# Patient Record
Sex: FEMALE | Race: WHITE | URBAN - METROPOLITAN AREA
[De-identification: names, ages, dates, MRNs, and addresses within clinical notes are randomized per-mention and may not be internally consistent; named-entity substitution may affect disease eponyms.]

---

## 2017-04-26 ENCOUNTER — APPOINTMENT (RX ONLY)
Dept: URBAN - METROPOLITAN AREA CLINIC 295 | Facility: CLINIC | Age: 72
Setting detail: DERMATOLOGY
End: 2017-04-26

## 2017-04-26 VITALS — WEIGHT: 150 LBS | HEIGHT: 65.5 IN

## 2017-04-26 DIAGNOSIS — L71.8 OTHER ROSACEA: ICD-10-CM

## 2017-04-26 PROBLEM — L70.0 ACNE VULGARIS: Status: ACTIVE | Noted: 2017-04-26

## 2017-04-26 PROBLEM — L85.3 XEROSIS CUTIS: Status: ACTIVE | Noted: 2017-04-26

## 2017-04-26 PROBLEM — L57.0 ACTINIC KERATOSIS: Status: ACTIVE | Noted: 2017-04-26

## 2017-04-26 PROCEDURE — ? COUNSELING

## 2017-04-26 PROCEDURE — ? TREATMENT REGIMEN

## 2017-04-26 PROCEDURE — ? PRESCRIPTION

## 2017-04-26 PROCEDURE — 99212 OFFICE O/P EST SF 10 MIN: CPT

## 2017-04-26 RX ORDER — METRONIDAZOLE 7.5 MG/G
GEL TOPICAL
Qty: 1 | Refills: 11 | Status: ERX | COMMUNITY
Start: 2017-04-26

## 2017-04-26 RX ADMIN — METRONIDAZOLE: 7.5 GEL TOPICAL at 19:55

## 2017-04-26 ASSESSMENT — LOCATION DETAILED DESCRIPTION DERM: LOCATION DETAILED: LEFT MEDIAL FOREHEAD

## 2017-04-26 ASSESSMENT — LOCATION SIMPLE DESCRIPTION DERM: LOCATION SIMPLE: LEFT FOREHEAD

## 2017-04-26 ASSESSMENT — LOCATION ZONE DERM: LOCATION ZONE: FACE

## 2017-04-26 ASSESSMENT — SEVERITY ASSESSMENT OVERALL AMONG ALL PATIENTS: IN YOUR EXPERIENCE, AMONG ALL PATIENTS YOU HAVE SEEN WITH THIS CONDITION, HOW SEVERE IS THIS PATIENT'S CONDITION?: CLEAR

## 2017-04-26 NOTE — PROCEDURE: TREATMENT REGIMEN
Continue Regimen: Metronidazole 0.75% gel nightly
Detail Level: Zone
Plan: Recommended sunscreen daily

## 2017-04-26 NOTE — PROCEDURE: MIPS QUALITY
Quality 128: Preventive Care And Screening: Body Mass Index (Bmi) Screening And Follow-Up Plan: BMI is documented within normal parameters and no follow-up plan is required.
Quality 431: Preventive Care And Screening: Unhealthy Alcohol Use - Screening: Patient screened for unhealthy alcohol use using a single question and scores less than 2 times per year
Quality 111:Pneumonia Vaccination Status For Older Adults: Pneumococcal Vaccination Administered
Detail Level: Detailed
Quality 130: Documentation Of Current Medications In The Medical Record: Current Medications Documented
Quality 226: Preventive Care And Screening: Tobacco Use: Screening And Cessation Intervention: Patient screened for tobacco and never smoked